# Patient Record
Sex: FEMALE | Race: WHITE | URBAN - NONMETROPOLITAN AREA
[De-identification: names, ages, dates, MRNs, and addresses within clinical notes are randomized per-mention and may not be internally consistent; named-entity substitution may affect disease eponyms.]

---
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
## 2024-01-25 DIAGNOSIS — J45.20 MILD INTERMITTENT ASTHMA, UNSPECIFIED WHETHER COMPLICATED: ICD-10-CM

## 2024-01-25 RX ORDER — ALBUTEROL SULFATE 90 UG/1
AEROSOL, METERED RESPIRATORY (INHALATION)
Qty: 8.5 G | Refills: 2 | Status: SHIPPED | OUTPATIENT
Start: 2024-01-25

## 2024-01-25 NOTE — TELEPHONE ENCOUNTER
This medication refill is regarding a electronic request. Refill requested by  [facility] .    Requested Prescriptions     Pending Prescriptions Disp Refills    albuterol sulfate HFA (PROVENTIL;VENTOLIN;PROAIR) 108 (90 Base) MCG/ACT inhaler [Pharmacy Med Name: ALBUTEROL HFA 90 MCG INHALER]       Sig: inhale 2 puffs by mouth every 6 hours if needed for wheezing     Date of last visit: 3/21/2023   Date of next visit: 3/25/2024  Date of last refill: 11/10/23 #18g/ 3     Rx verified, ordered and set to EP.

## 2024-03-19 DIAGNOSIS — J45.20 MILD INTERMITTENT ASTHMA, UNSPECIFIED WHETHER COMPLICATED: ICD-10-CM

## 2024-03-19 RX ORDER — ALBUTEROL SULFATE 90 UG/1
AEROSOL, METERED RESPIRATORY (INHALATION)
Qty: 8.5 G | Refills: 0 | Status: SHIPPED | OUTPATIENT
Start: 2024-03-19

## 2024-03-19 NOTE — TELEPHONE ENCOUNTER
This medication refill is regarding a telephone request. Refill requested by patient.    Requested Prescriptions     Pending Prescriptions Disp Refills    albuterol sulfate HFA (PROVENTIL;VENTOLIN;PROAIR) 108 (90 Base) MCG/ACT inhaler 8.5 g 0     Sig: inhale 2 puffs by mouth every 6 hours if needed for wheezing       Date of last visit: 3/21/2023   Date of next visit: none  Date of last refill: 1/25/24 for 1 inhaler with 2 refills [facility]; pt moved to [place] a few days ago and would like an Rx sent to a pharmacy there. She is in the process of finding a new PCP.   Pharmacy Name: [facility]    Rx verified, ordered and set to IVÁN AVILA